# Patient Record
Sex: FEMALE | Race: WHITE | URBAN - METROPOLITAN AREA
[De-identification: names, ages, dates, MRNs, and addresses within clinical notes are randomized per-mention and may not be internally consistent; named-entity substitution may affect disease eponyms.]

---

## 2022-03-23 ENCOUNTER — OFFICE VISIT (OUTPATIENT)
Dept: URBAN - METROPOLITAN AREA CLINIC 48 | Facility: CLINIC | Age: 37
End: 2022-03-23
Payer: COMMERCIAL

## 2022-03-23 DIAGNOSIS — H43.392 OTHER VITREOUS OPACITIES, LEFT EYE: Primary | ICD-10-CM

## 2022-03-23 PROCEDURE — 92004 COMPRE OPH EXAM NEW PT 1/>: CPT | Performed by: OPHTHALMOLOGY

## 2022-03-23 ASSESSMENT — INTRAOCULAR PRESSURE
OD: 19
OS: 21

## 2022-03-23 NOTE — IMPRESSION/PLAN
Impression: Other vitreous opacities, left eye: H43.392. will contact patient once [facility] pictures are available for review Plan: Vitreous floaters noted on exam. Patient reports VA is stable and no episodes of flashes or floaters noted. 

RTC 2wk DE/OCT

## 2023-03-31 ENCOUNTER — REFRACTIVE (OUTPATIENT)
Dept: URBAN - METROPOLITAN AREA CLINIC 21 | Facility: CLINIC | Age: 38
End: 2023-03-31

## 2023-03-31 DIAGNOSIS — H11.041 PERIPHERAL PTERYGIUM, STATIONARY, RIGHT EYE: ICD-10-CM

## 2023-03-31 DIAGNOSIS — H52.13 MYOPIA, BILATERAL: Primary | ICD-10-CM

## 2023-03-31 RX ORDER — PROGESTERONE 100 MG/1
100 MG CAPSULE ORAL
Qty: 0 | Refills: 0 | Status: ACTIVE
Start: 2023-03-31

## 2023-03-31 ASSESSMENT — VISUAL ACUITY
OD: 20/20
OS: 20/20

## 2023-03-31 NOTE — IMPRESSION/PLAN
Impression: Peripheral pterygium, stationary, right eye: H11.041. Plan: Advised patient of condition. Risks of progression discussed with patient. Patient elects not to treat at this time.

## 2023-03-31 NOTE — IMPRESSION/PLAN
Impression: Myopia, bilateral: H52.13. Plan: [address] candidate, OU due to thin corneas. Pt watching [employer] video today and meeting with counselors today.